# Patient Record
Sex: FEMALE | Race: WHITE | NOT HISPANIC OR LATINO
[De-identification: names, ages, dates, MRNs, and addresses within clinical notes are randomized per-mention and may not be internally consistent; named-entity substitution may affect disease eponyms.]

---

## 2018-06-08 ENCOUNTER — APPOINTMENT (OUTPATIENT)
Dept: HEMATOLOGY ONCOLOGY | Facility: CLINIC | Age: 43
End: 2018-06-08
Payer: COMMERCIAL

## 2018-06-08 VITALS
TEMPERATURE: 98.6 F | OXYGEN SATURATION: 99 % | SYSTOLIC BLOOD PRESSURE: 116 MMHG | DIASTOLIC BLOOD PRESSURE: 66 MMHG | WEIGHT: 108 LBS | BODY MASS INDEX: 17.78 KG/M2 | HEART RATE: 63 BPM | RESPIRATION RATE: 20 BRPM | HEIGHT: 65.35 IN

## 2018-06-08 PROCEDURE — 99245 OFF/OP CONSLTJ NEW/EST HI 55: CPT

## 2018-07-13 ENCOUNTER — APPOINTMENT (OUTPATIENT)
Dept: HEMATOLOGY ONCOLOGY | Facility: CLINIC | Age: 43
End: 2018-07-13
Payer: COMMERCIAL

## 2018-07-13 VITALS
BODY MASS INDEX: 17.78 KG/M2 | DIASTOLIC BLOOD PRESSURE: 65 MMHG | RESPIRATION RATE: 16 BRPM | TEMPERATURE: 98.5 F | OXYGEN SATURATION: 99 % | HEIGHT: 65.35 IN | HEART RATE: 54 BPM | SYSTOLIC BLOOD PRESSURE: 109 MMHG | WEIGHT: 108 LBS

## 2018-07-13 DIAGNOSIS — Z80.3 FAMILY HISTORY OF MALIGNANT NEOPLASM OF BREAST: ICD-10-CM

## 2018-07-13 DIAGNOSIS — Z80.0 FAMILY HISTORY OF MALIGNANT NEOPLASM OF DIGESTIVE ORGANS: ICD-10-CM

## 2018-07-13 DIAGNOSIS — D69.6 THROMBOCYTOPENIA, UNSPECIFIED: ICD-10-CM

## 2018-07-13 DIAGNOSIS — D72.819 DECREASED WHITE BLOOD CELL COUNT, UNSPECIFIED: ICD-10-CM

## 2018-07-13 PROCEDURE — 99215 OFFICE O/P EST HI 40 MIN: CPT

## 2018-08-27 ENCOUNTER — APPOINTMENT (OUTPATIENT)
Dept: HEMATOLOGY ONCOLOGY | Facility: CLINIC | Age: 43
End: 2018-08-27
Payer: COMMERCIAL

## 2018-08-27 VITALS
RESPIRATION RATE: 20 BRPM | DIASTOLIC BLOOD PRESSURE: 71 MMHG | TEMPERATURE: 98.6 F | HEIGHT: 65.35 IN | BODY MASS INDEX: 18.44 KG/M2 | HEART RATE: 60 BPM | OXYGEN SATURATION: 98 % | WEIGHT: 112 LBS | SYSTOLIC BLOOD PRESSURE: 119 MMHG

## 2018-08-27 PROCEDURE — 99214 OFFICE O/P EST MOD 30 MIN: CPT

## 2018-09-12 ENCOUNTER — TRANSCRIPTION ENCOUNTER (OUTPATIENT)
Age: 43
End: 2018-09-12

## 2018-11-14 ENCOUNTER — APPOINTMENT (OUTPATIENT)
Dept: HEMATOLOGY ONCOLOGY | Facility: CLINIC | Age: 43
End: 2018-11-14
Payer: COMMERCIAL

## 2018-11-14 ENCOUNTER — RESULT REVIEW (OUTPATIENT)
Age: 43
End: 2018-11-14

## 2018-11-14 VITALS
DIASTOLIC BLOOD PRESSURE: 63 MMHG | SYSTOLIC BLOOD PRESSURE: 105 MMHG | OXYGEN SATURATION: 100 % | TEMPERATURE: 97.4 F | WEIGHT: 113 LBS | HEIGHT: 65.35 IN | RESPIRATION RATE: 16 BRPM | HEART RATE: 61 BPM | BODY MASS INDEX: 18.6 KG/M2

## 2018-11-14 PROCEDURE — 99214 OFFICE O/P EST MOD 30 MIN: CPT

## 2018-11-14 NOTE — ASSESSMENT
[FreeTextEntry1] : Microcytic anemia with iron deficiency\par Likely from menstrual bleeding +/- hemorrhoids\par s/p IV injectafer 750 mg once a week x 2\par Clinically feels significanlty better. \par Labs reviewed- Hgb stable. Ferritin pending. she will call/email to discuss in a few days\par IV iron PRN for ferritin < \par \par Leucopenia, thrombocytoepnia\par Resolved\par \par Abdominal pain\par Mostly after meals, in epigastric/ LUQ region. \par EGD/colonoscopy without any identifiable cause\par CT findings suggestive of cecal mass vs extrinsic compression on cecum\par s/p repeat colonoscopy without any cecal mass. Few benign polyps\par Follows with Dr Holloway and Dr Varela\par \par Follow up in 12 weeks or sooner if necessary\par CBC, CMP, ferritin next appointment\par \par Contact:\par C: [phone]

## 2018-11-14 NOTE — HISTORY OF PRESENT ILLNESS
[de-identified] : Ms Aguilar is a very pleasant 42 year old female with no PMHx seen in the clinic to be evaluated for iron deficiency anemia\par \par She reports heavy menses - has 3 periods over the past 2 months\par First day is heavy but last for 5 days\par She saw Dr Holloway (gyn)- was referred to Dr Mercado (GI) for abdominal pain\par \par s/p EGD and colonoscopy (18)\par probable gastritis\par colon polyp\par hemorrhoids\par \par Pathology was essentially unremarkable except for\par Esophagus, distal, biopsy:\par Squamous epithelium with mild reactive changes suggestive of reflux disease.\par No glandular mucosa identified. \par Alcian blue/PAS stain is negative for fungal organisms.\par Colon, transverse polyp at 70 cm, cold snare and biopsy:\par Sessile serrated adenoma. \par \par She complains of tiredness and fatigue\par Feels sob when walks her dog up the hill\par Also has difficulty sleeping\par \par She has had work related stress but is not unusual\par \par Has never taken oral or IV iron or blood transfusion\par She was on prenatal and post  vitamins which she continued longer\par \par Social\par Works as a choreographer, director for a musical\par Originally from [facility]\par Non smoker \par \par She had a CT A/P (18) -  Although the right colon is well opacified, the cecum is not and appears to contain masslike soft tissue density. Cecal neoplasm cannot be excluded. The possibility of an extrinsic lesion arising from the right adnexa, extrinsically compressing the cecum also cannot be excluded. An area of ring enhancement within this area is noted. Further assessment by means of colonoscopy, as well as pelvic ultrasound is recommended.\par Lower uterine segment and cervix are prominent. Pap smear is recommended. Subcentimeter low-attenuation lesions of the cervix are compatible with nabothian cysts. \par There is a 1.3 cm low-attenuation lesion of the anterior aspect of the spleen compatible with a cyst.\par  [de-identified] : Seen today for follow up\par \par Continues to feel significantly better\par \par She has a new job and is excited about it\par \par She started birth control pills last month- bleeding is better, but had spotting which lasted for ~ 3 weeks

## 2018-11-14 NOTE — PHYSICAL EXAM
[Fully active, able to carry on all pre-disease performance without restriction] : Status 0 - Fully active, able to carry on all pre-disease performance without restriction [Normal] : no peripheral adenopathy appreciated [de-identified] : Tenderness to palpation in the periunbillical area on deep palpation. No rebound or guarding

## 2018-11-14 NOTE — CONSULT LETTER
[Dear  ___] : Dear  [unfilled], [Consult Letter:] : I had the pleasure of evaluating your patient, [unfilled]. [Please see my note below.] : Please see my note below. [Consult Closing:] : Thank you very much for allowing me to participate in the care of this patient.  If you have any questions, please do not hesitate to contact me. [Sincerely,] : Sincerely, [FreeTextEntry3] : Shlomo Castro MD, MPH\par Attending Physician\par Hematology Oncology\par [facility] [place]\par [facility]\par  [DrVince  ___] : Dr. MURPHY [DrVince ___] : Dr. MURPHY

## 2018-11-14 NOTE — CONSULT LETTER
[Dear  ___] : Dear  [unfilled], [Consult Letter:] : I had the pleasure of evaluating your patient, [unfilled]. [Please see my note below.] : Please see my note below. [Consult Closing:] : Thank you very much for allowing me to participate in the care of this patient.  If you have any questions, please do not hesitate to contact me. [Sincerely,] : Sincerely, [DrVince  ___] : Dr. MURPHY [DrVince ___] : Dr. MURPHY [FreeTextEntry3] : Shlomo Castro MD, MPH\par Attending Physician\par Hematology Oncology\par [facility] [place]\par [facility]\par

## 2018-11-14 NOTE — PHYSICAL EXAM
[Fully active, able to carry on all pre-disease performance without restriction] : Status 0 - Fully active, able to carry on all pre-disease performance without restriction [Normal] : no peripheral adenopathy appreciated [de-identified] : Tenderness to palpation in the periunbillical area on deep palpation. No rebound or guarding

## 2018-11-14 NOTE — ASSESSMENT
[FreeTextEntry1] : Microcytic anemia with iron deficiency\par Likely from menstrual bleeding +/- hemorrhoids\par s/p IV injectafer 750 mg once a week x 2\par Clinically feels significanlty better. \par Labs reviewed- Hgb stable. Ferritin pending. she will call/email to discuss in a few days\par IV iron PRN for ferritin < \par \par Leucopenia, thrombocytoepnia\par Resolved\par \par Abdominal pain\par Mostly after meals, in epigastric/ LUQ region. \par EGD/colonoscopy without any identifiable cause\par CT findings suggestive of cecal mass vs extrinsic compression on cecum\par s/p repeat colonoscopy without any cecal mass. Few benign polyps\par Follows with Dr Holloway and Dr Varela\par \par Follow up in 12 weeks or sooner if necessary\par CBC, CMP, ferritin next appointment\par \par Contact:\par C: [phone]

## 2018-11-14 NOTE — HISTORY OF PRESENT ILLNESS
[de-identified] : Ms Aguilar is a very pleasant 42 year old female with no PMHx seen in the clinic to be evaluated for iron deficiency anemia\par \par She reports heavy menses - has 3 periods over the past 2 months\par First day is heavy but last for 5 days\par She saw Dr Holloway (gyn)- was referred to Dr Mercado (GI) for abdominal pain\par \par s/p EGD and colonoscopy (18)\par probable gastritis\par colon polyp\par hemorrhoids\par \par Pathology was essentially unremarkable except for\par Esophagus, distal, biopsy:\par Squamous epithelium with mild reactive changes suggestive of reflux disease.\par No glandular mucosa identified. \par Alcian blue/PAS stain is negative for fungal organisms.\par Colon, transverse polyp at 70 cm, cold snare and biopsy:\par Sessile serrated adenoma. \par \par She complains of tiredness and fatigue\par Feels sob when walks her dog up the hill\par Also has difficulty sleeping\par \par She has had work related stress but is not unusual\par \par Has never taken oral or IV iron or blood transfusion\par She was on prenatal and post  vitamins which she continued longer\par \par Social\par Works as a choreographer, director for a musical\par Originally from [facility]\par Non smoker \par \par She had a CT A/P (18) -  Although the right colon is well opacified, the cecum is not and appears to contain masslike soft tissue density. Cecal neoplasm cannot be excluded. The possibility of an extrinsic lesion arising from the right adnexa, extrinsically compressing the cecum also cannot be excluded. An area of ring enhancement within this area is noted. Further assessment by means of colonoscopy, as well as pelvic ultrasound is recommended.\par Lower uterine segment and cervix are prominent. Pap smear is recommended. Subcentimeter low-attenuation lesions of the cervix are compatible with nabothian cysts. \par There is a 1.3 cm low-attenuation lesion of the anterior aspect of the spleen compatible with a cyst.\par  [de-identified] : Seen today for follow up\par \par Feels good overall\par She had sinus infection in October 2018 and is very gradually recovering from it\par Does feel a little tired and feel sob on exertion \par But able to carry on her ADLy\par \par Menses have been much less with birth control pills\par

## 2019-02-13 ENCOUNTER — APPOINTMENT (OUTPATIENT)
Dept: HEMATOLOGY ONCOLOGY | Facility: CLINIC | Age: 44
End: 2019-02-13

## 2019-02-26 ENCOUNTER — RESULT REVIEW (OUTPATIENT)
Age: 44
End: 2019-02-26

## 2019-02-26 ENCOUNTER — APPOINTMENT (OUTPATIENT)
Dept: HEMATOLOGY ONCOLOGY | Facility: CLINIC | Age: 44
End: 2019-02-26
Payer: COMMERCIAL

## 2019-02-26 VITALS
OXYGEN SATURATION: 98 % | TEMPERATURE: 98.5 F | DIASTOLIC BLOOD PRESSURE: 67 MMHG | RESPIRATION RATE: 18 BRPM | HEART RATE: 62 BPM | SYSTOLIC BLOOD PRESSURE: 115 MMHG | BODY MASS INDEX: 18.77 KG/M2 | HEIGHT: 65.35 IN | WEIGHT: 114 LBS

## 2019-02-26 PROCEDURE — 99214 OFFICE O/P EST MOD 30 MIN: CPT

## 2019-02-26 RX ORDER — OMEPRAZOLE 40 MG/1
40 CAPSULE, DELAYED RELEASE ORAL
Qty: 30 | Refills: 0 | Status: DISCONTINUED | COMMUNITY
Start: 2018-05-24 | End: 2019-02-26

## 2019-02-28 NOTE — CONSULT LETTER
[Dear  ___] : Dear  [unfilled], [Consult Letter:] : I had the pleasure of evaluating your patient, [unfilled]. [Please see my note below.] : Please see my note below. [Consult Closing:] : Thank you very much for allowing me to participate in the care of this patient.  If you have any questions, please do not hesitate to contact me. [Sincerely,] : Sincerely, [DrVince  ___] : Dr. MURPHY [DrVince ___] : Dr. MURPHY [FreeTextEntry3] : Shlomo Castro MD, MPH\par Attending Physician\par Hematology Oncology\par [facility] [place]\par [facility]\par

## 2019-02-28 NOTE — ASSESSMENT
[FreeTextEntry1] : Microcytic anemia with iron deficiency\par Likely from menstrual bleeding +/- hemorrhoids\par s/p IV injectafer 750 mg once a week x 2\par Clinically feels significanlty better. \par Labs reviewed- Hgb stable. Ferritin > 100. No IV iron for now\par IV iron PRN for ferritin < \par \par Leucopenia, thrombocytoepnia\par Resolved\par \par Abdominal pain\par Mostly after meals, in epigastric/ LUQ region. \par EGD/colonoscopy without any identifiable cause\par CT findings suggestive of cecal mass vs extrinsic compression on cecum\par s/p repeat colonoscopy without any cecal mass. Few benign polyps\par Follows with Dr Holloway and Dr Varela\par \par Follow up in 12 weeks or sooner if necessary\par CBC, CMP, ferritin next appointment\par \par Contact:\par C: [phone]

## 2019-02-28 NOTE — HISTORY OF PRESENT ILLNESS
[de-identified] : Ms Aguilar is a very pleasant 42 year old female with no PMHx seen in the clinic to be evaluated for iron deficiency anemia\par \par She reports heavy menses - has 3 periods over the past 2 months\par First day is heavy but last for 5 days\par She saw Dr Holloway (gyn)- was referred to Dr Mercado (GI) for abdominal pain\par \par s/p EGD and colonoscopy (18)\par probable gastritis\par colon polyp\par hemorrhoids\par \par Pathology was essentially unremarkable except for\par Esophagus, distal, biopsy:\par Squamous epithelium with mild reactive changes suggestive of reflux disease.\par No glandular mucosa identified. \par Alcian blue/PAS stain is negative for fungal organisms.\par Colon, transverse polyp at 70 cm, cold snare and biopsy:\par Sessile serrated adenoma. \par \par She complains of tiredness and fatigue\par Feels sob when walks her dog up the hill\par Also has difficulty sleeping\par \par She has had work related stress but is not unusual\par \par Has never taken oral or IV iron or blood transfusion\par She was on prenatal and post  vitamins which she continued longer\par \par Social\par Works as a choreographer, director for a musical\par Originally from [facility]\par Non smoker \par \par She had a CT A/P (18) -  Although the right colon is well opacified, the cecum is not and appears to contain masslike soft tissue density. Cecal neoplasm cannot be excluded. The possibility of an extrinsic lesion arising from the right adnexa, extrinsically compressing the cecum also cannot be excluded. An area of ring enhancement within this area is noted. Further assessment by means of colonoscopy, as well as pelvic ultrasound is recommended.\par Lower uterine segment and cervix are prominent. Pap smear is recommended. Subcentimeter low-attenuation lesions of the cervix are compatible with nabothian cysts. \par There is a 1.3 cm low-attenuation lesion of the anterior aspect of the spleen compatible with a cyst.\par  [de-identified] : Seen today for follow up\par \par She reports feeling more tired lately\par Menses have been much less with birth control pills\par

## 2019-02-28 NOTE — PHYSICAL EXAM
[Fully active, able to carry on all pre-disease performance without restriction] : Status 0 - Fully active, able to carry on all pre-disease performance without restriction [Normal] : no peripheral adenopathy appreciated [de-identified] : Tenderness to palpation in the periunbillical area on deep palpation. No rebound or guarding

## 2019-03-21 ENCOUNTER — TRANSCRIPTION ENCOUNTER (OUTPATIENT)
Age: 44
End: 2019-03-21

## 2019-03-21 ENCOUNTER — RX RENEWAL (OUTPATIENT)
Age: 44
End: 2019-03-21

## 2019-03-21 DIAGNOSIS — Z86.19 PERSONAL HISTORY OF OTHER INFECTIOUS AND PARASITIC DISEASES: ICD-10-CM

## 2019-05-14 ENCOUNTER — APPOINTMENT (OUTPATIENT)
Dept: HEMATOLOGY ONCOLOGY | Facility: CLINIC | Age: 44
End: 2019-05-14
Payer: COMMERCIAL

## 2019-05-21 ENCOUNTER — RESULT REVIEW (OUTPATIENT)
Age: 44
End: 2019-05-21

## 2019-05-21 ENCOUNTER — APPOINTMENT (OUTPATIENT)
Dept: HEMATOLOGY ONCOLOGY | Facility: CLINIC | Age: 44
End: 2019-05-21
Payer: COMMERCIAL

## 2019-05-21 ENCOUNTER — APPOINTMENT (OUTPATIENT)
Dept: OBGYN | Facility: CLINIC | Age: 44
End: 2019-05-21
Payer: COMMERCIAL

## 2019-05-21 VITALS — DIASTOLIC BLOOD PRESSURE: 70 MMHG | WEIGHT: 112 LBS | SYSTOLIC BLOOD PRESSURE: 122 MMHG | BODY MASS INDEX: 18.44 KG/M2

## 2019-05-21 VITALS
HEIGHT: 65.35 IN | BODY MASS INDEX: 18.44 KG/M2 | RESPIRATION RATE: 20 BRPM | HEART RATE: 59 BPM | OXYGEN SATURATION: 99 % | TEMPERATURE: 98.3 F | DIASTOLIC BLOOD PRESSURE: 65 MMHG | SYSTOLIC BLOOD PRESSURE: 130 MMHG | WEIGHT: 112 LBS

## 2019-05-21 DIAGNOSIS — Z30.41 ENCOUNTER FOR SURVEILLANCE OF CONTRACEPTIVE PILLS: ICD-10-CM

## 2019-05-21 PROCEDURE — 99213 OFFICE O/P EST LOW 20 MIN: CPT

## 2019-05-21 PROCEDURE — 99214 OFFICE O/P EST MOD 30 MIN: CPT

## 2019-05-21 PROCEDURE — 36415 COLL VENOUS BLD VENIPUNCTURE: CPT

## 2019-05-21 NOTE — HISTORY OF PRESENT ILLNESS
[de-identified] : Ms Aguilar is a very pleasant 42 year old female with no PMHx seen in the clinic to be evaluated for iron deficiency anemia\par \par She reports heavy menses - has 3 periods over the past 2 months\par First day is heavy but last for 5 days\par She saw Dr Holloway (gyn)- was referred to Dr Mercado (GI) for abdominal pain\par \par s/p EGD and colonoscopy (18)\par probable gastritis\par colon polyp\par hemorrhoids\par \par Pathology was essentially unremarkable except for\par Esophagus, distal, biopsy:\par Squamous epithelium with mild reactive changes suggestive of reflux disease.\par No glandular mucosa identified. \par Alcian blue/PAS stain is negative for fungal organisms.\par Colon, transverse polyp at 70 cm, cold snare and biopsy:\par Sessile serrated adenoma. \par \par She complains of tiredness and fatigue\par Feels sob when walks her dog up the hill\par Also has difficulty sleeping\par \par She has had work related stress but is not unusual\par \par Has never taken oral or IV iron or blood transfusion\par She was on prenatal and post  vitamins which she continued longer\par \par Social\par Works as a choreographer, director for a musical\par Originally from [facility]\par Non smoker \par \par She had a CT A/P (18) -  Although the right colon is well opacified, the cecum is not and appears to contain masslike soft tissue density. Cecal neoplasm cannot be excluded. The possibility of an extrinsic lesion arising from the right adnexa, extrinsically compressing the cecum also cannot be excluded. An area of ring enhancement within this area is noted. Further assessment by means of colonoscopy, as well as pelvic ultrasound is recommended.\par Lower uterine segment and cervix are prominent. Pap smear is recommended. Subcentimeter low-attenuation lesions of the cervix are compatible with nabothian cysts. \par There is a 1.3 cm low-attenuation lesion of the anterior aspect of the spleen compatible with a cyst.\par  [de-identified] : Seen today for follow up\par \par She complains of insomnia, she has not slept 2 days in a row for a while\par Does not tolerate ambien due to dizziness\par Saw gyn- was told that this could be due to hormonal changes\par \par No restless leg syndrome

## 2019-05-21 NOTE — PHYSICAL EXAM
[de-identified] : Tenderness to palpation in the periunbillical area on deep palpation. No rebound or guarding

## 2019-05-21 NOTE — CONSULT LETTER
[FreeTextEntry3] : Shlomo Castro MD, MPH\par Attending Physician\par Hematology Oncology\par [facility] [place]\par [facility]\par

## 2019-05-21 NOTE — ASSESSMENT
[FreeTextEntry1] : Microcytic anemia with iron deficiency\par Likely from menstrual bleeding +/- hemorrhoids\par s/p IV injectafer 750 mg once a week x 2\par Clinically feels significanlty better. \par Labs reviewed- Hgb stable. \par Ferritin pending- Patient will call to discuss findings/results in a few days\par IV iron PRN for ferritin < \par \par Leucopenia, thrombocytoepnia\par Resolved\par \par Abdominal pain\par Mostly after meals, in epigastric/ LUQ region. \par EGD/colonoscopy without any identifiable cause\par CT findings suggestive of cecal mass vs extrinsic compression on cecum\par s/p repeat colonoscopy without any cecal mass. Few benign polyps\par Follows with Dr Holloway and Dr Varela\par \par Follow up in 12 weeks or sooner if necessary\par CBC, CMP, ferritin next appointment\par \par Contact:\par C: [phone]

## 2019-05-22 LAB
ESTRADIOL SERPL-MCNC: 12 PG/ML
FSH SERPL-MCNC: 2.3 IU/L
LH SERPL-ACNC: 3.4 IU/L
TSH SERPL-ACNC: 0.81 UIU/ML

## 2019-05-22 NOTE — REVIEW OF SYSTEMS
[Vaginal Discharge] : vaginal discharge [Sleep Disturbances] : sleep disturbances [Anxiety] : anxiety [Hot Flashes] : hot flashes [Nl] : Integumentary

## 2019-05-22 NOTE — PHYSICAL EXAM
[Normal] : uterus [Discharge] : a  ~M vaginal discharge was present [Scant] : scant [White] : white [Thin] : thin [No Bleeding] : there was no active vaginal bleeding [Uterine Adnexae] : were not tender and not enlarged [Foul Smelling] : not foul smelling

## 2019-05-23 ENCOUNTER — RESULT REVIEW (OUTPATIENT)
Age: 44
End: 2019-05-23

## 2019-05-23 LAB
CANDIDA VAG CYTO: DETECTED
G VAGINALIS+PREV SP MTYP VAG QL MICRO: NOT DETECTED
T VAGINALIS VAG QL WET PREP: NOT DETECTED

## 2019-06-05 ENCOUNTER — APPOINTMENT (OUTPATIENT)
Dept: OBGYN | Facility: CLINIC | Age: 44
End: 2019-06-05
Payer: COMMERCIAL

## 2019-06-05 VITALS
HEIGHT: 63 IN | WEIGHT: 112 LBS | SYSTOLIC BLOOD PRESSURE: 118 MMHG | DIASTOLIC BLOOD PRESSURE: 66 MMHG | BODY MASS INDEX: 19.84 KG/M2

## 2019-06-05 DIAGNOSIS — G47.00 INSOMNIA, UNSPECIFIED: ICD-10-CM

## 2019-06-05 PROCEDURE — 99396 PREV VISIT EST AGE 40-64: CPT

## 2019-06-25 ENCOUNTER — APPOINTMENT (OUTPATIENT)
Dept: ENDOCRINOLOGY | Facility: CLINIC | Age: 44
End: 2019-06-25
Payer: COMMERCIAL

## 2019-06-25 VITALS
HEIGHT: 64.5 IN | SYSTOLIC BLOOD PRESSURE: 100 MMHG | WEIGHT: 113 LBS | DIASTOLIC BLOOD PRESSURE: 60 MMHG | OXYGEN SATURATION: 97 % | BODY MASS INDEX: 19.06 KG/M2 | HEART RATE: 64 BPM

## 2019-06-25 DIAGNOSIS — E06.3 AUTOIMMUNE THYROIDITIS: ICD-10-CM

## 2019-06-25 DIAGNOSIS — F32.89 OTHER SPECIFIED DEPRESSIVE EPISODES: ICD-10-CM

## 2019-06-25 PROCEDURE — 36415 COLL VENOUS BLD VENIPUNCTURE: CPT

## 2019-06-25 PROCEDURE — 99204 OFFICE O/P NEW MOD 45 MIN: CPT | Mod: 25

## 2019-06-25 RX ORDER — VENLAFAXINE HYDROCHLORIDE 37.5 MG/1
37.5 CAPSULE, EXTENDED RELEASE ORAL
Qty: 30 | Refills: 1 | Status: DISCONTINUED | COMMUNITY
Start: 2019-05-21 | End: 2019-06-25

## 2019-06-25 NOTE — HISTORY OF PRESENT ILLNESS
[FreeTextEntry1] : Ms. JONATHAN MADRIGAL is 43 year female  who  presents with concerns of thyroid disorder \par unable sleep since , somewhat anxious for last one year. \par month of May has been bad. \par Ambien not of much help. \par feels adrenaline going around \par also on E+P per OBGYN , \par family h/o thyroid disorder\par personal stressors are there but not new \par has had issues pre menstrually.  \par Biotin use-no \par Amiodarone use -no \par taking centrum vitamins, for iron \par Family h/o thyroid disorder-yes, \par prior h/o thyroid surgery -no \par prior h/o SALGADO treatment -no \par prior h/o thyroid medications -no \par prior h/o thyroid disorders -no \par Prior h/o radiation exposure to head or neck -no \par h/o exposure to nuclear accident -no \par family h/o thyroid cancer -no\par USG performed -no \par TSH checked - as below \par did not take Effexor. \par \par \par

## 2019-06-28 LAB
ESTIMATED AVERAGE GLUCOSE: 108 MG/DL
HBA1C MFR BLD HPLC: 5.4 %
T3FREE SERPL-MCNC: 2.97 PG/ML
T4 FREE SERPL-MCNC: 1.1 NG/DL
THYROGLOB AB SERPL-ACNC: <20 IU/ML
THYROPEROXIDASE AB SERPL IA-ACNC: <10 IU/ML
TSH SERPL-ACNC: 0.89 UIU/ML
TSI ACT/NOR SER: <0.1 IU/L

## 2019-08-27 ENCOUNTER — APPOINTMENT (OUTPATIENT)
Dept: HEMATOLOGY ONCOLOGY | Facility: CLINIC | Age: 44
End: 2019-08-27

## 2019-08-30 ENCOUNTER — RX RENEWAL (OUTPATIENT)
Age: 44
End: 2019-08-30

## 2019-10-18 ENCOUNTER — RX RENEWAL (OUTPATIENT)
Age: 44
End: 2019-10-18

## 2019-10-31 ENCOUNTER — APPOINTMENT (OUTPATIENT)
Dept: OBGYN | Facility: CLINIC | Age: 44
End: 2019-10-31
Payer: COMMERCIAL

## 2019-10-31 VITALS
BODY MASS INDEX: 18.89 KG/M2 | WEIGHT: 112 LBS | DIASTOLIC BLOOD PRESSURE: 72 MMHG | SYSTOLIC BLOOD PRESSURE: 120 MMHG | HEIGHT: 64.5 IN

## 2019-10-31 DIAGNOSIS — Z86.19 PERSONAL HISTORY OF OTHER INFECTIOUS AND PARASITIC DISEASES: ICD-10-CM

## 2019-10-31 DIAGNOSIS — N94.9 UNSPECIFIED CONDITION ASSOCIATED WITH FEMALE GENITAL ORGANS AND MENSTRUAL CYCLE: ICD-10-CM

## 2019-10-31 LAB
BILIRUB UR QL STRIP: NORMAL
CLARITY UR: NORMAL
COLLECTION METHOD: NORMAL
GLUCOSE UR-MCNC: NORMAL
HCG UR QL: 0.2 EU/DL
HGB UR QL STRIP.AUTO: NORMAL
KETONES UR-MCNC: NORMAL
LEUKOCYTE ESTERASE UR QL STRIP: NORMAL
NITRITE UR QL STRIP: NORMAL
PH UR STRIP: 7
PROT UR STRIP-MCNC: NORMAL
SP GR UR STRIP: 1.01

## 2019-10-31 PROCEDURE — 99213 OFFICE O/P EST LOW 20 MIN: CPT

## 2019-10-31 PROCEDURE — 81003 URINALYSIS AUTO W/O SCOPE: CPT | Mod: QW

## 2019-10-31 RX ORDER — ZOLPIDEM TARTRATE 5 MG/1
5 TABLET ORAL
Qty: 30 | Refills: 3 | Status: DISCONTINUED | COMMUNITY
Start: 2019-03-21 | End: 2019-10-31

## 2019-10-31 RX ORDER — NORETHINDRONE ACETATE AND ETHINYL ESTRADIOL 1.5; 3 MG/1; UG/1
1.5-3 TABLET ORAL DAILY
Qty: 28 | Refills: 11 | Status: DISCONTINUED | COMMUNITY
Start: 2019-06-05 | End: 2019-10-31

## 2019-10-31 RX ORDER — FLUCONAZOLE 150 MG/1
150 TABLET ORAL
Qty: 2 | Refills: 2 | Status: DISCONTINUED | COMMUNITY
Start: 2019-05-23 | End: 2019-10-31

## 2019-11-01 PROBLEM — N94.9 VAGINAL DISCOMFORT: Status: ACTIVE | Noted: 2019-11-01

## 2019-11-01 LAB
HBV SURFACE AG SER QL: NONREACTIVE
HCV AB SER QL: NONREACTIVE
HCV S/CO RATIO: 0.21 S/CO
HIV1+2 AB SPEC QL IA.RAPID: NONREACTIVE

## 2019-11-01 NOTE — REVIEW OF SYSTEMS
[Vaginal Pain] : vaginal pain [Vaginal Discharge] : vaginal discharge [Sleep Disturbances] : sleep disturbances [Anxiety] : anxiety [Nl] : Integumentary [Fever] : no fever [Chills] : no chills

## 2019-11-01 NOTE — PHYSICAL EXAM
[Awake] : awake [Alert] : alert [Soft] : soft [Normal Mental Status] : the patient was oriented to person, place and time [Appropriate] : appropriate [No Lesions] : no genitalia lesions [Labia Majora Erythema] : erythema of the labia majora [Labia Minora Erythema] : erythema of the labia minora [Normal] : external genitalia [Erythema] : erythema [Discharge] : a  ~M vaginal discharge was present [Heavy] : heavy [Vanessa] : yellow [Cheesy] : cheesy [Acute Distress] : no acute distress [Tender] : non tender [Foul Smelling] : not foul smelling

## 2019-11-04 ENCOUNTER — RESULT REVIEW (OUTPATIENT)
Age: 44
End: 2019-11-04

## 2019-11-04 LAB
C TRACH RRNA SPEC QL NAA+PROBE: NOT DETECTED
CANDIDA VAG CYTO: DETECTED
G VAGINALIS+PREV SP MTYP VAG QL MICRO: NOT DETECTED
N GONORRHOEA RRNA SPEC QL NAA+PROBE: NOT DETECTED
RPR SER-TITR: NORMAL
SOURCE AMPLIFICATION: NORMAL
T VAGINALIS VAG QL WET PREP: NOT DETECTED

## 2020-02-12 ENCOUNTER — APPOINTMENT (OUTPATIENT)
Dept: HEMATOLOGY ONCOLOGY | Facility: CLINIC | Age: 45
End: 2020-02-12
Payer: COMMERCIAL

## 2020-02-12 ENCOUNTER — RESULT REVIEW (OUTPATIENT)
Age: 45
End: 2020-02-12

## 2020-02-12 VITALS
SYSTOLIC BLOOD PRESSURE: 114 MMHG | DIASTOLIC BLOOD PRESSURE: 65 MMHG | OXYGEN SATURATION: 99 % | RESPIRATION RATE: 16 BRPM | TEMPERATURE: 98.9 F | HEART RATE: 61 BPM | WEIGHT: 110.9 LBS | BODY MASS INDEX: 18.93 KG/M2 | HEIGHT: 64.29 IN

## 2020-02-12 DIAGNOSIS — R10.9 UNSPECIFIED ABDOMINAL PAIN: ICD-10-CM

## 2020-02-12 PROCEDURE — 99214 OFFICE O/P EST MOD 30 MIN: CPT

## 2020-02-13 ENCOUNTER — RX RENEWAL (OUTPATIENT)
Age: 45
End: 2020-02-13

## 2020-02-13 PROBLEM — R10.9 ABDOMINAL PAIN: Status: ACTIVE | Noted: 2020-02-13

## 2020-02-13 NOTE — CONSULT LETTER
[Dear  ___] : Dear  [unfilled], [Please see my note below.] : Please see my note below. [Consult Letter:] : I had the pleasure of evaluating your patient, [unfilled]. [Sincerely,] : Sincerely, [Consult Closing:] : Thank you very much for allowing me to participate in the care of this patient.  If you have any questions, please do not hesitate to contact me. [DrVince  ___] : Dr. MURPHY [DrVince ___] : Dr. MURPHY [FreeTextEntry3] : Shlomo Castro MD, MPH\par Attending Physician\par Hematology Oncology\par [facility] [place]\par [facility]\par

## 2020-02-13 NOTE — ASSESSMENT
[FreeTextEntry1] : Microcytic anemia with iron deficiency\par Likely from menstrual bleeding +/- hemorrhoids\par Clinically has stated to feel tired and fatigued\par Labs reviewed and discussed- Hgb stable.\par Menses have been better since IUD placement \par Ferritin > 100 \par No IV iron\par IV iron PRN for ferritin < \par \par Leucopenia, thrombocytoepnia\par Resolved\par \par Abdominal pain\par Likely related to GERD\par Advised to follow up with GI\par EGD/colonoscopy without any identifiable cause\par CT findings suggestive of cecal mass vs extrinsic compression on cecum\par s/p repeat colonoscopy without any cecal mass. Few benign polyps\par Recommend follows with Dr Varela\par \par Follow up in 12 weeks or sooner if necessary\par CBC, CMP, ferritin next appointment\par \par Contact:\par C: [phone]

## 2020-02-13 NOTE — HISTORY OF PRESENT ILLNESS
[de-identified] : Ms Aguilar is a very pleasant 42 year old female with no PMHx seen in the clinic to be evaluated for iron deficiency anemia\par \par She reports heavy menses - has 3 periods over the past 2 months\par First day is heavy but last for 5 days\par She saw Dr Holloway (gyn)- was referred to Dr Mercado (GI) for abdominal pain\par \par s/p EGD and colonoscopy (18)\par probable gastritis\par colon polyp\par hemorrhoids\par \par Pathology was essentially unremarkable except for\par Esophagus, distal, biopsy:\par Squamous epithelium with mild reactive changes suggestive of reflux disease.\par No glandular mucosa identified. \par Alcian blue/PAS stain is negative for fungal organisms.\par Colon, transverse polyp at 70 cm, cold snare and biopsy:\par Sessile serrated adenoma. \par \par She complains of tiredness and fatigue\par Feels sob when walks her dog up the hill\par Also has difficulty sleeping\par \par She has had work related stress but is not unusual\par \par Has never taken oral or IV iron or blood transfusion\par She was on prenatal and post  vitamins which she continued longer\par \par Social\par Works as a choreographer, director for a musical\par Originally from [facility]\par Non smoker \par \par She had a CT A/P (18) -  Although the right colon is well opacified, the cecum is not and appears to contain masslike soft tissue density. Cecal neoplasm cannot be excluded. The possibility of an extrinsic lesion arising from the right adnexa, extrinsically compressing the cecum also cannot be excluded. An area of ring enhancement within this area is noted. Further assessment by means of colonoscopy, as well as pelvic ultrasound is recommended.\par Lower uterine segment and cervix are prominent. Pap smear is recommended. Subcentimeter low-attenuation lesions of the cervix are compatible with nabothian cysts. \par There is a 1.3 cm low-attenuation lesion of the anterior aspect of the spleen compatible with a cyst.\par  [de-identified] : Seen today for follow up\par She was last seen May 2019 and lost to follow up\par \par She has started to feel tired and fatigued\par Also has dizziness\par \par Abdominal pain- similar to before for the past 1 month\par Also has diarrhea intermittently\par Feels like it is diet related and she has cut down a lot of foods \par Increase tip gap between the meals can trigger the pain\par No nausea, vomiting\par She has lost 1 lb since May 2019\par Omeprazole helps with the pain-  but she prefers natural treatment\par \par Sleep issues have settled - mostly able to sleep well\par \par LMP 2/7/20\par Menses have been regular and very little - mostly spotting as she is on birth control pills\par

## 2020-02-13 NOTE — PHYSICAL EXAM
[Fully active, able to carry on all pre-disease performance without restriction] : Status 0 - Fully active, able to carry on all pre-disease performance without restriction [Normal] : no peripheral adenopathy appreciated [de-identified] : Tenderness to palpation in the periunbillical area on deep palpation. No rebound or guarding

## 2020-06-02 ENCOUNTER — RX RENEWAL (OUTPATIENT)
Age: 45
End: 2020-06-02

## 2020-06-24 ENCOUNTER — APPOINTMENT (OUTPATIENT)
Dept: OBGYN | Facility: CLINIC | Age: 45
End: 2020-06-24
Payer: COMMERCIAL

## 2020-06-24 VITALS
HEART RATE: 67 BPM | HEIGHT: 64.9 IN | BODY MASS INDEX: 18.33 KG/M2 | TEMPERATURE: 98.9 F | DIASTOLIC BLOOD PRESSURE: 58 MMHG | WEIGHT: 110 LBS | OXYGEN SATURATION: 99 % | SYSTOLIC BLOOD PRESSURE: 110 MMHG

## 2020-06-24 DIAGNOSIS — N63.10 UNSPECIFIED LUMP IN THE RIGHT BREAST, UNSPECIFIED QUADRANT: ICD-10-CM

## 2020-06-24 PROCEDURE — 99213 OFFICE O/P EST LOW 20 MIN: CPT

## 2020-10-16 ENCOUNTER — APPOINTMENT (OUTPATIENT)
Dept: GASTROENTEROLOGY | Facility: CLINIC | Age: 45
End: 2020-10-16

## 2020-11-24 ENCOUNTER — APPOINTMENT (OUTPATIENT)
Dept: GASTROENTEROLOGY | Facility: CLINIC | Age: 45
End: 2020-11-24
Payer: COMMERCIAL

## 2020-11-24 DIAGNOSIS — K21.9 GASTRO-ESOPHAGEAL REFLUX DISEASE W/OUT ESOPHAGITIS: ICD-10-CM

## 2020-11-24 PROCEDURE — 99442: CPT

## 2020-11-25 PROBLEM — K21.9 CHRONIC GERD: Status: ACTIVE | Noted: 2020-02-14

## 2020-11-25 NOTE — HISTORY OF PRESENT ILLNESS
[FreeTextEntry1] : 46 yo f with GERD presents for follow up. \par she started ppi  02/2020 for LUQ pain which helped. \par IN 09/2020 worsening pain in LUQ a/w some diarrhea. \par She stopped claritin and then stopped PPI at end of Oct. \par now off of ppi and feeling well. no abd pain. \par She is adhering to  low acid diet\par oj and wine can ppt symptoms\par When she is very hungry LUQ pain can return. \par She is concerned that severe dieting she did when a teenager may contribute to her abdominal pain. \par \par She has a good appetite, no heartburn. no n/v. bowel movements are normal. no brbpr/melena. weight is stable. \par no frequent NSAID use.

## 2020-11-25 NOTE — ASSESSMENT
[FreeTextEntry1] : GERD with LUQ pain- currently controlled off of PPI. COunseled patient on antireflux diet/lifestyle. \par If mild symptoms return, patient will try famotidine. If persistent symptoms, patient will resume ppi and notify me. \par \par

## 2020-11-25 NOTE — PHYSICAL EXAM
[General Appearance - Alert] : alert [General Appearance - In No Acute Distress] : in no acute distress [General Appearance - Well-Appearing] : healthy appearing [] : no respiratory distress [Oriented To Time, Place, And Person] : oriented to person, place, and time [Impaired Insight] : insight and judgment were intact [Memory Recent] : recent memory was not impaired

## 2020-12-16 ENCOUNTER — RX RENEWAL (OUTPATIENT)
Age: 45
End: 2020-12-16

## 2020-12-21 PROBLEM — Z86.19 HISTORY OF CANDIDIASIS OF VAGINA: Status: RESOLVED | Noted: 2019-03-21 | Resolved: 2020-12-21

## 2020-12-21 PROBLEM — Z86.19 HISTORY OF CANDIDAL VULVOVAGINITIS: Status: RESOLVED | Noted: 2019-10-31 | Resolved: 2020-12-21

## 2021-02-16 ENCOUNTER — RX RENEWAL (OUTPATIENT)
Age: 46
End: 2021-02-16

## 2021-12-02 ENCOUNTER — APPOINTMENT (OUTPATIENT)
Dept: OBGYN | Facility: CLINIC | Age: 46
End: 2021-12-02
Payer: COMMERCIAL

## 2021-12-02 ENCOUNTER — RESULT CHARGE (OUTPATIENT)
Age: 46
End: 2021-12-02

## 2021-12-02 VITALS
WEIGHT: 113 LBS | SYSTOLIC BLOOD PRESSURE: 112 MMHG | BODY MASS INDEX: 18.83 KG/M2 | DIASTOLIC BLOOD PRESSURE: 70 MMHG | HEIGHT: 65 IN

## 2021-12-02 DIAGNOSIS — R10.2 PELVIC AND PERINEAL PAIN: ICD-10-CM

## 2021-12-02 DIAGNOSIS — Z12.4 ENCOUNTER FOR SCREENING FOR MALIGNANT NEOPLASM OF CERVIX: ICD-10-CM

## 2021-12-02 DIAGNOSIS — N89.8 OTHER SPECIFIED NONINFLAMMATORY DISORDERS OF VAGINA: ICD-10-CM

## 2021-12-02 DIAGNOSIS — B37.9 CANDIDIASIS, UNSPECIFIED: ICD-10-CM

## 2021-12-02 DIAGNOSIS — Z00.00 ENCOUNTER FOR GENERAL ADULT MEDICAL EXAMINATION W/OUT ABNORMAL FINDINGS: ICD-10-CM

## 2021-12-02 PROCEDURE — 99214 OFFICE O/P EST MOD 30 MIN: CPT

## 2021-12-02 PROCEDURE — 81003 URINALYSIS AUTO W/O SCOPE: CPT | Mod: QW

## 2021-12-06 LAB
BILIRUB UR QL STRIP: NORMAL
C TRACH RRNA SPEC QL NAA+PROBE: NOT DETECTED
CANDIDA VAG CYTO: NOT DETECTED
CLARITY UR: CLEAR
COLLECTION METHOD: NORMAL
G VAGINALIS+PREV SP MTYP VAG QL MICRO: NOT DETECTED
GLUCOSE UR-MCNC: NORMAL
HBV SURFACE AG SER QL: NONREACTIVE
HCG UR QL: 0.2 EU/DL
HCV AB SER QL: NONREACTIVE
HCV S/CO RATIO: 0.09 S/CO
HGB UR QL STRIP.AUTO: NORMAL
HIV1+2 AB SPEC QL IA.RAPID: NONREACTIVE
HPV HIGH+LOW RISK DNA PNL CVX: NOT DETECTED
KETONES UR-MCNC: NORMAL
LEUKOCYTE ESTERASE UR QL STRIP: NORMAL
N GONORRHOEA RRNA SPEC QL NAA+PROBE: NOT DETECTED
NITRITE UR QL STRIP: NORMAL
PH UR STRIP: 5.5
PROT UR STRIP-MCNC: NORMAL
SOURCE TP AMPLIFICATION: NORMAL
SP GR UR STRIP: 1.02
T PALLIDUM AB SER QL IA: NEGATIVE
T VAGINALIS VAG QL WET PREP: NOT DETECTED

## 2021-12-08 LAB — CYTOLOGY CVX/VAG DOC THIN PREP: NORMAL

## 2022-03-30 ENCOUNTER — APPOINTMENT (OUTPATIENT)
Dept: OBGYN | Facility: CLINIC | Age: 47
End: 2022-03-30
Payer: COMMERCIAL

## 2022-03-30 VITALS
HEIGHT: 65 IN | WEIGHT: 110 LBS | BODY MASS INDEX: 18.33 KG/M2 | SYSTOLIC BLOOD PRESSURE: 118 MMHG | DIASTOLIC BLOOD PRESSURE: 76 MMHG

## 2022-03-30 PROCEDURE — 99396 PREV VISIT EST AGE 40-64: CPT

## 2022-03-30 RX ORDER — OMEPRAZOLE 20 MG/1
20 CAPSULE, DELAYED RELEASE ORAL
Qty: 30 | Refills: 3 | Status: COMPLETED | COMMUNITY
Start: 2020-02-14 | End: 2022-03-30

## 2022-03-30 RX ORDER — FLUCONAZOLE 150 MG/1
150 TABLET ORAL
Qty: 1 | Refills: 0 | Status: COMPLETED | COMMUNITY
Start: 2021-12-02 | End: 2022-03-30

## 2022-03-30 RX ORDER — METRONIDAZOLE 7.5 MG/G
0.75 GEL VAGINAL
Qty: 1 | Refills: 0 | Status: COMPLETED | COMMUNITY
Start: 2021-11-24 | End: 2022-03-30

## 2022-03-30 NOTE — HISTORY OF PRESENT ILLNESS
[FreeTextEntry1] : 45yo  LMP 3/9/22 on Estrostep(generic now) here for annual Gyn exam. Has had cramping and mood issues since change to generic Estrostep. Has worsening premenstrual irritability and sleep disturbance. She admits to numerous stressors in her life at present and feels her anxiety is at a high right now due to marital and work issues. She has not been seeing a therapist regularly. She is interested in taking medication for PMDD\par \par \par \par OB History\par Total Pregnancies: 4, Full Term: 3, Living: 3,  Spontaneous: 1. \par Past Pregnancies: Total pregnancies  4.  Total living children  3.  Miscarriage(s)  1.  C section(s)  3.  Pregnancy 1:   Primary , Fetal distress, Female "Sneha".  Pregnancy 2:   Repeat , Failed  (fetal distress), Male "Jonah".  Pregnancy 3   Repeat , Male "Humble".     \par  \par  [Mammogramdate] : 7/6/20 [TextBox_19] : ERNESTO LÓPEZ 1D [BreastSonogramDate] : 7/6/20 [TextBox_25] : HVRA BIRADS 1 [PapSmeardate] : 12/2/21 [TextBox_31] : Neg/HPV Neg

## 2022-04-05 ENCOUNTER — APPOINTMENT (OUTPATIENT)
Dept: HEMATOLOGY ONCOLOGY | Facility: CLINIC | Age: 47
End: 2022-04-05
Payer: COMMERCIAL

## 2022-04-05 ENCOUNTER — RESULT REVIEW (OUTPATIENT)
Age: 47
End: 2022-04-05

## 2022-04-05 VITALS
RESPIRATION RATE: 18 BRPM | DIASTOLIC BLOOD PRESSURE: 67 MMHG | HEART RATE: 58 BPM | TEMPERATURE: 97.2 F | HEIGHT: 65 IN | SYSTOLIC BLOOD PRESSURE: 119 MMHG | BODY MASS INDEX: 18.99 KG/M2 | WEIGHT: 114 LBS | OXYGEN SATURATION: 100 %

## 2022-04-05 DIAGNOSIS — D50.0 IRON DEFICIENCY ANEMIA SECONDARY TO BLOOD LOSS (CHRONIC): ICD-10-CM

## 2022-04-05 PROCEDURE — 36415 COLL VENOUS BLD VENIPUNCTURE: CPT

## 2022-04-05 PROCEDURE — 99213 OFFICE O/P EST LOW 20 MIN: CPT | Mod: 25

## 2022-04-05 NOTE — PHYSICAL EXAM
[Fully active, able to carry on all pre-disease performance without restriction] : Status 0 - Fully active, able to carry on all pre-disease performance without restriction [Normal] : no peripheral adenopathy appreciated [de-identified] : Tenderness to palpation in the periunbillical area on deep palpation. No rebound or guarding

## 2022-04-05 NOTE — ASSESSMENT
[FreeTextEntry1] : Microcytic anemia with iron deficiency\par Likely from menstrual bleeding +/- hemorrhoids\par s/p Injectafer x 2 June 2028 and May 2019\par Labs reviewed and discussed- Hgb stable.\par Menses have been better with oral contraceptive pills.\par Now with fatigue and dizziness - diarrhea with Floradix\par Labs reviewed, analyzed, and discussed\par H/H acceptable\par Ferritin pending - to give IV iron if Ferritin < 50. \par \par #\par Abdominal pain - resolved \par s/  EGD/colonoscopy without any identifiable cause\par CT findings suggestive of cecal mass vs extrinsic compression on cecum\par s/p repeat colonoscopy without any cecal mass. Few benign polyps\par was seen and evaluated by GI  Dr Varela\par \par d/w Dr. Castro \par Patient will call to follow up\par CBC, CMP, ferritin, iron panel\par \par Contact:\par C: [phone]

## 2022-04-05 NOTE — CONSULT LETTER
[Dear  ___] : Dear  [unfilled], [Consult Letter:] : I had the pleasure of evaluating your patient, [unfilled]. [Please see my note below.] : Please see my note below. [Consult Closing:] : Thank you very much for allowing me to participate in the care of this patient.  If you have any questions, please do not hesitate to contact me. [Sincerely,] : Sincerely, [DrVince  ___] : Dr. MURPHY [DrVince ___] : Dr. MURPHY [FreeTextEntry3] : Shlomo Castro MD, MPH\par Attending Physician\par Hematology Oncology\par [facility] [place]\par [facility]\par

## 2022-04-05 NOTE — HISTORY OF PRESENT ILLNESS
[de-identified] : Ms Aguilar is a very pleasant 42 year old female with no PMHx seen in the clinic to be evaluated for iron deficiency anemia\par \par She reports heavy menses - has 3 periods over the past 2 months\par First day is heavy but last for 5 days\par She saw Dr Holloway (gyn)- was referred to Dr Mercado (GI) for abdominal pain\par \par s/p EGD and colonoscopy (18)\par probable gastritis\par colon polyp\par hemorrhoids\par \par Pathology was essentially unremarkable except for\par Esophagus, distal, biopsy:\par Squamous epithelium with mild reactive changes suggestive of reflux disease.\par No glandular mucosa identified. \par Alcian blue/PAS stain is negative for fungal organisms.\par Colon, transverse polyp at 70 cm, cold snare and biopsy:\par Sessile serrated adenoma. \par \par She complains of tiredness and fatigue\par Feels sob when walks her dog up the hill\par Also has difficulty sleeping\par \par She has had work related stress but is not unusual\par \par Has never taken oral or IV iron or blood transfusion\par She was on prenatal and post  vitamins which she continued longer\par \par Social\par Works as a choreographer, director for a musical\par Originally from [facility]\par Non smoker \par \par She had a CT A/P (18) -  Although the right colon is well opacified, the cecum is not and appears to contain masslike soft tissue density. Cecal neoplasm cannot be excluded. The possibility of an extrinsic lesion arising from the right adnexa, extrinsically compressing the cecum also cannot be excluded. An area of ring enhancement within this area is noted. Further assessment by means of colonoscopy, as well as pelvic ultrasound is recommended.\par Lower uterine segment and cervix are prominent. Pap smear is recommended. Subcentimeter low-attenuation lesions of the cervix are compatible with nabothian cysts. \par There is a 1.3 cm low-attenuation lesion of the anterior aspect of the spleen compatible with a cyst.\par  [de-identified] : Patient was last seen in Feb 2022 \par \par She has been more fatigue and feeling dizzy lately, more than usual.\par Her menses are irregular due to being on oral contraceptive pills.  Currently on her period.\par She has tried to take Floradix but it causing some abdominal discomfort and diarrhea. \par \par

## 2022-12-14 ENCOUNTER — RX RENEWAL (OUTPATIENT)
Age: 47
End: 2022-12-14

## 2023-08-23 RX ORDER — FLUOXETINE HYDROCHLORIDE 10 MG/1
10 TABLET ORAL DAILY
Qty: 30 | Refills: 3 | Status: ACTIVE | COMMUNITY
Start: 2022-03-30 | End: 1900-01-01

## 2023-09-26 DIAGNOSIS — N76.0 ACUTE VAGINITIS: ICD-10-CM

## 2023-09-26 DIAGNOSIS — B96.89 ACUTE VAGINITIS: ICD-10-CM

## 2023-09-26 RX ORDER — METRONIDAZOLE 7.5 MG/G
0.75 GEL VAGINAL
Qty: 1 | Refills: 0 | Status: ACTIVE | COMMUNITY
Start: 2023-09-26 | End: 1900-01-01

## 2023-10-23 RX ORDER — NORETHINDRONE ACETATE AND ETHINYL ESTRADIOL 5-7-9-7
1-20/1-30/1-35 KIT ORAL
Qty: 3 | Refills: 0 | Status: ACTIVE | COMMUNITY
Start: 2022-12-14 | End: 1900-01-01

## 2024-01-10 ENCOUNTER — RESULT REVIEW (OUTPATIENT)
Age: 49
End: 2024-01-10

## 2024-01-10 ENCOUNTER — APPOINTMENT (OUTPATIENT)
Dept: HEMATOLOGY ONCOLOGY | Facility: CLINIC | Age: 49
End: 2024-01-10

## 2024-01-10 VITALS
BODY MASS INDEX: 20.03 KG/M2 | SYSTOLIC BLOOD PRESSURE: 121 MMHG | WEIGHT: 120.25 LBS | HEART RATE: 58 BPM | TEMPERATURE: 97 F | HEIGHT: 65 IN | RESPIRATION RATE: 16 BRPM | DIASTOLIC BLOOD PRESSURE: 70 MMHG | OXYGEN SATURATION: 96 %

## 2024-01-11 NOTE — ASSESSMENT
[FreeTextEntry1] : Microcytic anemia with iron deficiency  Likely from menstrual bleeding +/- hemorrhoids  s/p Injectafer x 2 June 2028 and May 2019  Labs reviewed and discussed- Hgb stable.  Menses have been better with oral contraceptive pills.  Now with fatigue and dizziness - diarrhea with Floradix  Labs reviewed, analyzed, and discussed  H/H acceptable  Ferritin pending - to give IV iron if Ferritin < 50.    #  Abdominal pain - resolved  s/ EGD/colonoscopy without any identifiable cause  CT findings suggestive of cecal mass vs extrinsic compression on cecum  s/p repeat colonoscopy without any cecal mass. Few benign polyps  was seen and evaluated by GI Dr Varela    d/w Dr. Castro  Patient will call to follow up  CBC, CMP, ferritin, iron panel    Contact:  C: [phone].

## 2024-01-11 NOTE — CONSULT LETTER
[Dear  ___] : Dear  [unfilled], [Consult Letter:] : I had the pleasure of evaluating your patient, [unfilled]. [Please see my note below.] : Please see my note below. [Consult Closing:] : Thank you very much for allowing me to participate in the care of this patient.  If you have any questions, please do not hesitate to contact me. [Sincerely,] : Sincerely, [FreeTextEntry3] : Shlomo Castro MD, MPH\par  Attending Physician\par  Hematology Oncology\par  [facility] [place]\par  [facility]\par   [DrVince  ___] : Dr. MURPHY [DrVince ___] : Dr. MURPHY

## 2024-01-11 NOTE — HISTORY OF PRESENT ILLNESS
[de-identified] : Ms Aguilar is a very pleasant 42 year old female with no PMHx seen in the clinic to be evaluated for iron deficiency anemia\par  \par  She reports heavy menses - has 3 periods over the past 2 months\par  First day is heavy but last for 5 days\par  She saw Dr Holloway (gyn)- was referred to Dr Mercado (GI) for abdominal pain\par  \par  s/p EGD and colonoscopy (18)\par  probable gastritis\par  colon polyp\par  hemorrhoids\par  \par  Pathology was essentially unremarkable except for\par  Esophagus, distal, biopsy:\par  Squamous epithelium with mild reactive changes suggestive of reflux disease.\par  No glandular mucosa identified. \par  Alcian blue/PAS stain is negative for fungal organisms.\par  Colon, transverse polyp at 70 cm, cold snare and biopsy:\par  Sessile serrated adenoma. \par  \par  She complains of tiredness and fatigue\par  Feels sob when walks her dog up the hill\par  Also has difficulty sleeping\par  \par  She has had work related stress but is not unusual\par  \par  Has never taken oral or IV iron or blood transfusion\par  She was on prenatal and post  vitamins which she continued longer\par  \par  Social\par  Works as a choreographer, director for a musical\par  Originally from [facility]\par  Non smoker \par  \par  She had a CT A/P (18) -  Although the right colon is well opacified, the cecum is not and appears to contain masslike soft tissue density. Cecal neoplasm cannot be excluded. The possibility of an extrinsic lesion arising from the right adnexa, extrinsically compressing the cecum also cannot be excluded. An area of ring enhancement within this area is noted. Further assessment by means of colonoscopy, as well as pelvic ultrasound is recommended.\par  Lower uterine segment and cervix are prominent. Pap smear is recommended. Subcentimeter low-attenuation lesions of the cervix are compatible with nabothian cysts. \par  There is a 1.3 cm low-attenuation lesion of the anterior aspect of the spleen compatible with a cyst.\par   [de-identified] : Patient was last seen in Feb 2022 \par  \par  She has been more fatigue and feeling dizzy lately, more than usual.\par  Her menses are irregular due to being on oral contraceptive pills.  Currently on her period.\par  She has tried to take Floradix but it causing some abdominal discomfort and diarrhea. \par  \par

## 2024-01-11 NOTE — PHYSICAL EXAM
[Fully active, able to carry on all pre-disease performance without restriction] : Status 0 - Fully active, able to carry on all pre-disease performance without restriction [Normal] : no peripheral adenopathy appreciated [de-identified] : Tenderness to palpation in the periunbillical area on deep palpation. No rebound or guarding

## 2024-01-12 PROBLEM — Z01.419 ENCOUNTER FOR ANNUAL ROUTINE GYNECOLOGICAL EXAMINATION: Status: ACTIVE | Noted: 2019-06-05

## 2024-01-12 PROBLEM — Z78.9 DOES NOT USE ILLICIT DRUGS: Status: ACTIVE | Noted: 2024-01-12

## 2024-01-12 PROBLEM — Z78.9 DOES NOT USE TOBACCO: Status: ACTIVE | Noted: 2024-01-12

## 2024-01-12 PROBLEM — R92.30 DENSE BREASTS: Status: ACTIVE | Noted: 2019-06-05

## 2024-01-12 PROBLEM — Z78.9 ALCOHOL USE: Status: ACTIVE | Noted: 2024-01-12

## 2024-01-12 PROBLEM — Z12.31 SCREENING MAMMOGRAM, ENCOUNTER FOR: Status: ACTIVE | Noted: 2019-06-05

## 2024-01-12 RX ORDER — NORETHINDRONE ACETATE AND ETHINYL ESTRADIOL AND FERROUS FUMARATE 5-7-9-7
1-20/1-30/1-35 KIT ORAL
Qty: 3 | Refills: 3 | Status: DISCONTINUED | COMMUNITY
Start: 2019-05-21 | End: 2024-01-12

## 2024-01-16 ENCOUNTER — APPOINTMENT (OUTPATIENT)
Dept: OBGYN | Facility: CLINIC | Age: 49
End: 2024-01-16
Payer: COMMERCIAL

## 2024-01-16 VITALS
SYSTOLIC BLOOD PRESSURE: 120 MMHG | WEIGHT: 120 LBS | HEIGHT: 65 IN | BODY MASS INDEX: 19.99 KG/M2 | DIASTOLIC BLOOD PRESSURE: 70 MMHG

## 2024-01-16 DIAGNOSIS — Z11.3 ENCOUNTER FOR SCREENING FOR INFECTIONS WITH A PREDOMINANTLY SEXUAL MODE OF TRANSMISSION: ICD-10-CM

## 2024-01-16 DIAGNOSIS — Z12.31 ENCOUNTER FOR SCREENING MAMMOGRAM FOR MALIGNANT NEOPLASM OF BREAST: ICD-10-CM

## 2024-01-16 DIAGNOSIS — Z01.419 ENCOUNTER FOR GYNECOLOGICAL EXAMINATION (GENERAL) (ROUTINE) W/OUT ABNORMAL FINDINGS: ICD-10-CM

## 2024-01-16 DIAGNOSIS — Z78.9 OTHER SPECIFIED HEALTH STATUS: ICD-10-CM

## 2024-01-16 DIAGNOSIS — R92.30 DENSE BREASTS, UNSPECIFIED: ICD-10-CM

## 2024-01-16 PROCEDURE — 36415 COLL VENOUS BLD VENIPUNCTURE: CPT

## 2024-01-16 PROCEDURE — 99396 PREV VISIT EST AGE 40-64: CPT

## 2024-01-16 RX ORDER — NORETHINDRONE ACETATE AND ETHINYL ESTRADIOL 5-7-9-7
1-20/1-30/1-35 KIT ORAL
Qty: 3 | Refills: 3 | Status: ACTIVE | COMMUNITY
Start: 2024-01-16 | End: 1900-01-01

## 2024-01-16 NOTE — PHYSICAL EXAM
[Chaperone Present] : A chaperone was present in the examining room during all aspects of the physical examination [No Lymphadenopathy] : no lymphadenopathy [Soft] : soft [Non-tender] : non-tender [No HSM] : No HSM [No Mass] : no mass [Nl Sphincter Tone] : normal sphincter tone [FreeTextEntry1] : AVA Galvin [FreeTextEntry3] : No thyroid nodules [FreeTextEntry7] : Pfan scar [Examination Of The Breasts] : a normal appearance [No Masses] : no breast masses were palpable [Labia Majora] : normal [Labia Minora] : normal [Normal] : normal [Tenderness] : nontender [Retroversion] : retroverted [Enlarged ___ wks] : not enlarged [Uterine Adnexae] : normal [FreeTextEntry9] : No masses

## 2024-01-16 NOTE — HISTORY OF PRESENT ILLNESS
[TextBox_4] :  (3 C/S's - , , ) LMP last wk who has been taking birth control pills and is a healthy non-smoker.  Here for routine GYN care. Pt think her M was over 50 yrs at [facility]. D/C'ing pill addressed. Will get day 28 FSH. 3/30/2022 encounter note indicates premenstrual irritability and sleep disturbance as well as marital and work-related stress. Cx cryo at ~21 yrs for REBEKAH 1. 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[employer]), director for a musical Originally [place] [facility] Also has difficulty sleeping She has had work related stress but is not unusual [Mammogramdate] : 7/6/2020 [TextBox_19] : Heterogeneously dense; no evidence of malignancy [BreastSonogramDate] : 7/6/2020 [TextBox_25] : No evidence of malignancy [PapSmeardate] : 12/2/2021 [TextBox_31] : neg; HPV neg neg; HPV neg [ColonoscopyDate] : 5/24/2018 [TextBox_43] : EGD and colonoscopy [Currently Active] : currently active [Men] : men [Vaginal] : vaginal [No] : No [FreeTextEntry2] : Same partner since 2018.

## 2024-01-17 LAB
C TRACH RRNA SPEC QL NAA+PROBE: NOT DETECTED
HBV SURFACE AG SER QL: NONREACTIVE
HCV AB SER QL: NONREACTIVE
HCV S/CO RATIO: 0.08 S/CO
HIV1+2 AB SPEC QL IA.RAPID: NONREACTIVE
N GONORRHOEA RRNA SPEC QL NAA+PROBE: NOT DETECTED
SOURCE AMPLIFICATION: NORMAL
T PALLIDUM AB SER QL IA: NEGATIVE

## 2024-01-18 ENCOUNTER — APPOINTMENT (OUTPATIENT)
Dept: HEMATOLOGY ONCOLOGY | Facility: CLINIC | Age: 49
End: 2024-01-18

## 2024-01-18 ENCOUNTER — NON-APPOINTMENT (OUTPATIENT)
Age: 49
End: 2024-01-18

## 2024-01-18 LAB
HSV 1+2 IGG SER IA-IMP: NEGATIVE
HSV 1+2 IGG SER IA-IMP: POSITIVE
HSV1 IGG SER QL: 4.49 INDEX
HSV2 IGG SER QL: 0.28 INDEX

## 2024-02-09 ENCOUNTER — APPOINTMENT (OUTPATIENT)
Dept: OBGYN | Facility: CLINIC | Age: 49
End: 2024-02-09
Payer: COMMERCIAL

## 2024-02-09 DIAGNOSIS — Z31.41 ENCOUNTER FOR FERTILITY TESTING: ICD-10-CM

## 2024-02-09 PROCEDURE — 36415 COLL VENOUS BLD VENIPUNCTURE: CPT

## 2024-02-10 LAB — FSH SERPL-MCNC: 38.3 IU/L

## 2024-05-08 ENCOUNTER — APPOINTMENT (OUTPATIENT)
Dept: OBGYN | Facility: CLINIC | Age: 49
End: 2024-05-08
Payer: COMMERCIAL

## 2024-05-08 PROCEDURE — 36415 COLL VENOUS BLD VENIPUNCTURE: CPT

## 2024-05-13 LAB
ESTRADIOL SERPL-MCNC: 45 PG/ML
FSH SERPL-MCNC: 18.7 IU/L
FSH SERPL-MCNC: 19 IU/L

## 2024-12-25 PROBLEM — F10.90 ALCOHOL USE: Status: ACTIVE | Noted: 2024-01-12

## 2025-01-17 ENCOUNTER — NON-APPOINTMENT (OUTPATIENT)
Age: 50
End: 2025-01-17

## 2025-02-05 ENCOUNTER — NON-APPOINTMENT (OUTPATIENT)
Age: 50
End: 2025-02-05

## 2025-02-05 ENCOUNTER — APPOINTMENT (OUTPATIENT)
Dept: OBGYN | Facility: CLINIC | Age: 50
End: 2025-02-05
Payer: COMMERCIAL

## 2025-02-05 VITALS
DIASTOLIC BLOOD PRESSURE: 68 MMHG | SYSTOLIC BLOOD PRESSURE: 100 MMHG | HEIGHT: 65 IN | BODY MASS INDEX: 20.01 KG/M2 | WEIGHT: 120.13 LBS

## 2025-02-05 DIAGNOSIS — Z01.419 ENCOUNTER FOR GYNECOLOGICAL EXAMINATION (GENERAL) (ROUTINE) W/OUT ABNORMAL FINDINGS: ICD-10-CM

## 2025-02-05 DIAGNOSIS — N95.1 MENOPAUSAL AND FEMALE CLIMACTERIC STATES: ICD-10-CM

## 2025-02-05 DIAGNOSIS — Z12.11 ENCOUNTER FOR SCREENING FOR MALIGNANT NEOPLASM OF COLON: ICD-10-CM

## 2025-02-05 PROCEDURE — 36415 COLL VENOUS BLD VENIPUNCTURE: CPT

## 2025-02-05 PROCEDURE — 99396 PREV VISIT EST AGE 40-64: CPT

## 2025-02-05 RX ORDER — NORETHINDRONE ACETATE AND ETHINYL ESTRADIOL, ETHINYL ESTRADIOL AND FERROUS FUMARATE 1MG-10(24)
1 MG-10 MCG / KIT ORAL
Qty: 3 | Refills: 3 | Status: ACTIVE | COMMUNITY
Start: 2025-02-05 | End: 1900-01-01

## 2025-02-07 ENCOUNTER — TRANSCRIPTION ENCOUNTER (OUTPATIENT)
Age: 50
End: 2025-02-07

## 2025-02-07 LAB
ESTRADIOL SERPL-MCNC: 117 PG/ML
FSH SERPL-MCNC: 28.1 IU/L
HPV HIGH+LOW RISK DNA PNL CVX: NOT DETECTED

## 2025-02-08 ENCOUNTER — TRANSCRIPTION ENCOUNTER (OUTPATIENT)
Age: 50
End: 2025-02-08

## 2025-02-09 LAB — CYTOLOGY CVX/VAG DOC THIN PREP: NORMAL

## 2025-03-04 ENCOUNTER — APPOINTMENT (OUTPATIENT)
Dept: GASTROENTEROLOGY | Facility: CLINIC | Age: 50
End: 2025-03-04